# Patient Record
Sex: MALE | Race: WHITE | NOT HISPANIC OR LATINO | Employment: FULL TIME | URBAN - METROPOLITAN AREA
[De-identification: names, ages, dates, MRNs, and addresses within clinical notes are randomized per-mention and may not be internally consistent; named-entity substitution may affect disease eponyms.]

---

## 2017-01-05 ENCOUNTER — APPOINTMENT (OUTPATIENT)
Dept: WOMENS IMAGING | Facility: HOSPITAL | Age: 52
End: 2017-01-05

## 2017-01-05 PROCEDURE — 71010: CPT | Performed by: RADIOLOGY

## 2018-01-29 ENCOUNTER — CONVERSION ENCOUNTER (OUTPATIENT)
Dept: FAMILY MEDICINE CLINIC | Facility: CLINIC | Age: 53
End: 2018-01-29

## 2018-02-22 ENCOUNTER — APPOINTMENT (OUTPATIENT)
Dept: WOMENS IMAGING | Facility: HOSPITAL | Age: 53
End: 2018-02-22

## 2018-03-12 ENCOUNTER — OFFICE VISIT CONVERTED (OUTPATIENT)
Dept: FAMILY MEDICINE CLINIC | Facility: CLINIC | Age: 53
End: 2018-03-12
Attending: FAMILY MEDICINE

## 2018-03-15 ENCOUNTER — CONVERSION ENCOUNTER (OUTPATIENT)
Dept: FAMILY MEDICINE CLINIC | Facility: CLINIC | Age: 53
End: 2018-03-15

## 2018-07-12 ENCOUNTER — CONVERSION ENCOUNTER (OUTPATIENT)
Dept: FAMILY MEDICINE CLINIC | Facility: CLINIC | Age: 53
End: 2018-07-12

## 2018-10-16 ENCOUNTER — CONVERSION ENCOUNTER (OUTPATIENT)
Dept: FAMILY MEDICINE CLINIC | Facility: CLINIC | Age: 53
End: 2018-10-16
Attending: FAMILY MEDICINE

## 2018-12-07 ENCOUNTER — OFFICE VISIT CONVERTED (OUTPATIENT)
Dept: FAMILY MEDICINE CLINIC | Facility: CLINIC | Age: 53
End: 2018-12-07
Attending: FAMILY MEDICINE

## 2019-01-10 ENCOUNTER — OFFICE VISIT CONVERTED (OUTPATIENT)
Dept: OTOLARYNGOLOGY | Facility: CLINIC | Age: 54
End: 2019-01-10
Attending: OTOLARYNGOLOGY

## 2019-02-21 ENCOUNTER — HOSPITAL ENCOUNTER (OUTPATIENT)
Dept: FAMILY MEDICINE CLINIC | Facility: CLINIC | Age: 54
Discharge: HOME | End: 2019-02-21
Attending: NURSE PRACTITIONER

## 2019-02-21 ENCOUNTER — APPOINTMENT (OUTPATIENT)
Dept: WOMENS IMAGING | Facility: HOSPITAL | Age: 54
End: 2019-02-21

## 2019-03-26 ENCOUNTER — OFFICE VISIT CONVERTED (OUTPATIENT)
Dept: FAMILY MEDICINE CLINIC | Facility: CLINIC | Age: 54
End: 2019-03-26
Attending: FAMILY MEDICINE

## 2019-03-26 ENCOUNTER — CONVERSION ENCOUNTER (OUTPATIENT)
Dept: FAMILY MEDICINE CLINIC | Facility: CLINIC | Age: 54
End: 2019-03-26

## 2020-02-11 ENCOUNTER — HOSPITAL ENCOUNTER (OUTPATIENT)
Dept: FAMILY MEDICINE CLINIC | Facility: CLINIC | Age: 55
Discharge: HOME | End: 2020-02-11
Attending: NURSE PRACTITIONER

## 2020-02-11 ENCOUNTER — APPOINTMENT (OUTPATIENT)
Dept: WOMENS IMAGING | Facility: HOSPITAL | Age: 55
End: 2020-02-11

## 2020-06-05 ENCOUNTER — TELEPHONE (OUTPATIENT)
Dept: GASTROENTEROLOGY | Facility: CLINIC | Age: 55
End: 2020-06-05

## 2020-06-05 NOTE — TELEPHONE ENCOUNTER
Received referral from [facility].  Called and LM for patient to schedule.  Scanned referral into media

## 2020-12-04 ENCOUNTER — TELEPHONE (OUTPATIENT)
Dept: GASTROENTEROLOGY | Facility: CLINIC | Age: 55
End: 2020-12-04

## 2020-12-04 NOTE — TELEPHONE ENCOUNTER
Received VM from patient.  Called and LM for patient letting him know he had appt with Dr Robertson and orders could be placed at that time.    Left phone number with ext.  In case he has further questions

## 2020-12-10 ENCOUNTER — OFFICE VISIT (OUTPATIENT)
Dept: GASTROENTEROLOGY | Facility: CLINIC | Age: 55
End: 2020-12-10

## 2020-12-10 VITALS — TEMPERATURE: 97 F | HEIGHT: 74 IN | WEIGHT: 236.4 LBS | BODY MASS INDEX: 30.34 KG/M2

## 2020-12-10 DIAGNOSIS — Z12.12 SCREENING FOR COLORECTAL CANCER: ICD-10-CM

## 2020-12-10 DIAGNOSIS — K21.9 GASTROESOPHAGEAL REFLUX DISEASE, UNSPECIFIED WHETHER ESOPHAGITIS PRESENT: ICD-10-CM

## 2020-12-10 DIAGNOSIS — Z12.11 SCREENING FOR COLORECTAL CANCER: ICD-10-CM

## 2020-12-10 DIAGNOSIS — K22.70 BARRETT'S ESOPHAGUS WITHOUT DYSPLASIA: Primary | ICD-10-CM

## 2020-12-10 PROCEDURE — 99203 OFFICE O/P NEW LOW 30 MIN: CPT | Performed by: INTERNAL MEDICINE

## 2020-12-10 RX ORDER — ATENOLOL 50 MG/1
75 TABLET ORAL DAILY
COMMUNITY

## 2020-12-10 RX ORDER — LOSARTAN POTASSIUM AND HYDROCHLOROTHIAZIDE 12.5; 1 MG/1; MG/1
1 TABLET ORAL DAILY
COMMUNITY
Start: 2020-10-19

## 2020-12-10 NOTE — PROGRESS NOTES
Chief Complaint   Patient presents with   • Heartburn   • Candido's Esophagus        Augusto Jones is a  55 y.o. male here for an initial visit for GERD, Waite's esophagus    HPI this 55-year-old white male patient of Flor Francois MD presents with a history of Waite's esophagus and also possible screening evaluation for colorectal cancer.  He recalls having colonoscopy in his 40s and again at the age of 48.  There is no strong family history of polyps or colorectal cancer and he denies any significant bowel problems.  I encouraged him to consider screening but would also have him check with his insurance carrier to make sure he qualifies.  His Waite's issues needs to be defined, his last upper endoscopy in 2016 was negative for Waite's changes.  He does have reflux issues with intermittent use of esomeprazole.  No complaints of dysphagia.  He does note occasional trace of blood on the tissue associated with internal hemorrhoids.    Past Medical History:   Diagnosis Date   • Waite's esophagus 2010    Dr. Poole at [facility]   • Environmental and seasonal allergies    • GERD (gastroesophageal reflux disease)    • Hypertension    • Ringing of ears, bilateral        Current Outpatient Medications   Medication Sig Dispense Refill   • atenolol (TENORMIN) 50 MG tablet Take 75 mg by mouth Daily.     • losartan-hydrochlorothiazide (HYZAAR) 100-12.5 MG per tablet Take 1 tablet by mouth Daily.       No current facility-administered medications for this visit.        PRN Meds:.    No Known Allergies    Social History     Socioeconomic History   • Marital status: Unknown     Spouse name: Not on file   • Number of children: Not on file   • Years of education: Not on file   • Highest education level: Not on file   Tobacco Use   • Smoking status: Former Smoker     Types: Cigarettes   • Smokeless tobacco: Never Used   Substance and Sexual Activity   • Alcohol use: Yes     Alcohol/week: 12.0 standard  drinks     Types: 12 Cans of beer per week     Binge frequency: Weekly     Comment: Drinks between 10 to 12 beers only on the weekend.   • Drug use: Never   • Sexual activity: Defer       No family history on file.    Review of Systems   Constitutional: Negative for activity change, appetite change, fatigue and unexpected weight change.   HENT: Negative for congestion, facial swelling, sore throat, trouble swallowing and voice change.    Eyes: Negative for photophobia and visual disturbance.   Respiratory: Negative for cough and choking.    Cardiovascular: Negative for chest pain.   Gastrointestinal: Negative for abdominal distention, abdominal pain, anal bleeding, blood in stool, constipation, diarrhea, nausea, rectal pain and vomiting.        GERD   Endocrine: Negative for polyphagia.   Musculoskeletal: Negative for arthralgias, gait problem and joint swelling.   Skin: Negative for color change, pallor and rash.   Allergic/Immunologic: Negative for food allergies.   Neurological: Negative for speech difficulty and headaches.   Hematological: Does not bruise/bleed easily.   Psychiatric/Behavioral: Negative for agitation, confusion and sleep disturbance.       Vitals:    12/10/20 1321   Temp: 97 °F (36.1 °C)       Physical Exam  Vitals signs reviewed.   Constitutional:       General: He is not in acute distress.     Appearance: He is well-developed. He is not diaphoretic.   HENT:      Head: Normocephalic.      Mouth/Throat:      Pharynx: No oropharyngeal exudate.   Eyes:      General: No scleral icterus.     Conjunctiva/sclera: Conjunctivae normal.   Neck:      Musculoskeletal: Normal range of motion.      Thyroid: No thyromegaly.   Cardiovascular:      Rate and Rhythm: Normal rate and regular rhythm.      Heart sounds: No murmur.   Pulmonary:      Effort: No respiratory distress.      Breath sounds: Normal breath sounds. No wheezing or rales.   Abdominal:      General: Bowel sounds are normal. There is no  distension.      Palpations: Abdomen is soft. There is no mass.      Tenderness: There is no abdominal tenderness.   Musculoskeletal: Normal range of motion.         General: No tenderness.   Lymphadenopathy:      Cervical: No cervical adenopathy.   Skin:     General: Skin is warm and dry.      Findings: No erythema or rash.   Neurological:      Mental Status: He is alert and oriented to person, place, and time.   Psychiatric:         Behavior: Behavior normal.         ASSESSMENT   #1 GERD  #2 Waite's esophagus  #3 screening for colorectal cancer        PLAN  Schedule EGD and colonoscopy  Patient to clear colonoscopy with insurance carrier      ICD-10-CM ICD-9-CM   1. Waite's esophagus without dysplasia  K22.70 530.85   2. Screening for colorectal cancer  Z12.11 V76.51    Z12.12 V76.41

## 2021-01-05 ENCOUNTER — TRANSCRIBE ORDERS (OUTPATIENT)
Dept: LAB | Facility: HOSPITAL | Age: 56
End: 2021-01-05

## 2021-01-05 DIAGNOSIS — Z01.818 OTHER SPECIFIED PRE-OPERATIVE EXAMINATION: Primary | ICD-10-CM

## 2021-01-12 ENCOUNTER — LAB (OUTPATIENT)
Dept: LAB | Facility: HOSPITAL | Age: 56
End: 2021-01-12

## 2021-01-12 ENCOUNTER — TELEPHONE (OUTPATIENT)
Dept: GASTROENTEROLOGY | Facility: CLINIC | Age: 56
End: 2021-01-12

## 2021-01-12 DIAGNOSIS — Z01.818 OTHER SPECIFIED PRE-OPERATIVE EXAMINATION: ICD-10-CM

## 2021-01-12 NOTE — TELEPHONE ENCOUNTER
Returned pt call to cancel/reschedule no answer lm on  pt to call back to reschedule placed in depot with galen

## 2021-05-09 VITALS
DIASTOLIC BLOOD PRESSURE: 102 MMHG | HEART RATE: 72 BPM | WEIGHT: 234.12 LBS | SYSTOLIC BLOOD PRESSURE: 144 MMHG | OXYGEN SATURATION: 97 % | BODY MASS INDEX: 30.04 KG/M2 | TEMPERATURE: 97.8 F | HEIGHT: 74 IN

## 2021-05-09 VITALS
HEART RATE: 68 BPM | DIASTOLIC BLOOD PRESSURE: 82 MMHG | WEIGHT: 235 LBS | BODY MASS INDEX: 30.16 KG/M2 | SYSTOLIC BLOOD PRESSURE: 136 MMHG | TEMPERATURE: 97.8 F | OXYGEN SATURATION: 96 % | HEIGHT: 74 IN

## 2021-05-09 VITALS — SYSTOLIC BLOOD PRESSURE: 128 MMHG | DIASTOLIC BLOOD PRESSURE: 84 MMHG

## 2021-05-09 VITALS
HEIGHT: 74 IN | SYSTOLIC BLOOD PRESSURE: 146 MMHG | HEART RATE: 60 BPM | WEIGHT: 238 LBS | BODY MASS INDEX: 30.54 KG/M2 | OXYGEN SATURATION: 97 % | DIASTOLIC BLOOD PRESSURE: 98 MMHG | TEMPERATURE: 97.4 F

## 2021-05-09 VITALS — SYSTOLIC BLOOD PRESSURE: 144 MMHG | DIASTOLIC BLOOD PRESSURE: 88 MMHG

## 2021-05-09 VITALS — SYSTOLIC BLOOD PRESSURE: 134 MMHG | DIASTOLIC BLOOD PRESSURE: 78 MMHG

## 2021-05-10 ENCOUNTER — HOSPITAL ENCOUNTER (OUTPATIENT)
Dept: FAMILY MEDICINE CLINIC | Facility: CLINIC | Age: 56
Discharge: HOME | End: 2021-05-10
Attending: NURSE PRACTITIONER

## 2021-05-10 ENCOUNTER — APPOINTMENT (OUTPATIENT)
Dept: WOMENS IMAGING | Facility: HOSPITAL | Age: 56
End: 2021-05-10

## 2021-05-15 VITALS
WEIGHT: 236.25 LBS | SYSTOLIC BLOOD PRESSURE: 140 MMHG | OXYGEN SATURATION: 97 % | RESPIRATION RATE: 16 BRPM | HEIGHT: 74 IN | DIASTOLIC BLOOD PRESSURE: 88 MMHG | BODY MASS INDEX: 30.32 KG/M2 | TEMPERATURE: 97.8 F | HEART RATE: 66 BPM

## 2022-02-10 ENCOUNTER — PRE-PROCEDURE SCREENING (OUTPATIENT)
Dept: GASTROENTEROLOGY | Facility: CLINIC | Age: 57
End: 2022-02-10

## 2022-04-27 ENCOUNTER — OFFICE VISIT (OUTPATIENT)
Dept: GASTROENTEROLOGY | Facility: CLINIC | Age: 57
End: 2022-04-27

## 2022-04-27 VITALS
WEIGHT: 275 LBS | TEMPERATURE: 97.1 F | HEIGHT: 74 IN | SYSTOLIC BLOOD PRESSURE: 155 MMHG | BODY MASS INDEX: 35.29 KG/M2 | DIASTOLIC BLOOD PRESSURE: 90 MMHG

## 2022-04-27 DIAGNOSIS — Z87.19 HISTORY OF BARRETT'S ESOPHAGUS: Primary | ICD-10-CM

## 2022-04-27 DIAGNOSIS — Z12.12 SCREENING FOR COLORECTAL CANCER: ICD-10-CM

## 2022-04-27 DIAGNOSIS — K21.9 GASTROESOPHAGEAL REFLUX DISEASE, UNSPECIFIED WHETHER ESOPHAGITIS PRESENT: ICD-10-CM

## 2022-04-27 DIAGNOSIS — Z12.11 SCREENING FOR COLORECTAL CANCER: ICD-10-CM

## 2022-04-27 DIAGNOSIS — Z83.71 FH: COLON POLYPS: ICD-10-CM

## 2022-04-27 PROBLEM — Z83.719 FH: COLON POLYPS: Status: ACTIVE | Noted: 2022-04-27

## 2022-04-27 PROCEDURE — 99214 OFFICE O/P EST MOD 30 MIN: CPT | Performed by: NURSE PRACTITIONER

## 2022-04-27 RX ORDER — FLUTICASONE PROPIONATE 50 MCG
SPRAY, SUSPENSION (ML) NASAL
COMMUNITY
Start: 2022-02-08

## 2022-04-27 RX ORDER — PANTOPRAZOLE SODIUM 40 MG/1
40 TABLET, DELAYED RELEASE ORAL DAILY
COMMUNITY
Start: 2022-02-08 | End: 2022-09-21 | Stop reason: SDUPTHER

## 2022-04-27 NOTE — PROGRESS NOTES
Chief Complaint   Patient presents with   • Heartburn   • Waite's Esophagus       Augusto Jones is a  56 y.o. male here for a follow up visit for GERD.    HPI  56-year-old male presents today for follow-up visit for GERD.  He is a patient of Dr. Robertson.  He was last seen in the office by him on 12/10/2020.  He is new to me today.  He was scheduled for an EGD with screening colonoscopy after last visit but unfortunately due to COVID-19 those scopes were canceled.  He has never had a screening colonoscopy before.  His last EGD was in 2016 at [facility].  The patient does have a previous history of Waite's esophagus without dysplasia.  He tells me his reflux is pretty well controlled now on Protonix 40 mg every other day.  He denies any dysphagia, reflux, abdominal pain, nausea and vomiting, diarrhea, constipation, rectal bleeding or melena.  He admits his appetite is good and his weight is stable.  He does have a family history of colon polyps with his father.  He is happy report his bowels move really well every day.  Past Medical History:   Diagnosis Date   • Waite's esophagus 2010    Dr. Poole at [facility]   • Environmental and seasonal allergies    • GERD (gastroesophageal reflux disease)    • Hypertension    • Ringing of ears, bilateral        Past Surgical History:   Procedure Laterality Date   • UPPER GASTROINTESTINAL ENDOSCOPY  2015    [facility]   • UPPER GASTROINTESTINAL ENDOSCOPY  2016    [facility]       Scheduled Meds:    Continuous Infusions:No current facility-administered medications for this visit.      PRN Meds:.    No Known Allergies    Social History     Socioeconomic History   • Marital status:    Tobacco Use   • Smoking status: Former Smoker     Types: Cigarettes   • Smokeless tobacco: Never Used   Substance and Sexual Activity   • Alcohol use: Yes     Alcohol/week: 6.0 standard drinks     Types: 6 Cans of beer per week   • Drug use: Never   • Sexual activity: Defer        History reviewed. No pertinent family history.    Review of Systems   Constitutional: Negative for appetite change, chills, diaphoresis, fatigue, fever and unexpected weight change.   HENT: Negative for nosebleeds, postnasal drip, sore throat, trouble swallowing and voice change.    Respiratory: Negative for cough, choking, chest tightness, shortness of breath and wheezing.    Cardiovascular: Negative for chest pain.   Gastrointestinal: Negative for abdominal distention, abdominal pain, anal bleeding, blood in stool, constipation, diarrhea, nausea, rectal pain and vomiting.   Endocrine: Negative for polydipsia, polyphagia and polyuria.   Musculoskeletal: Negative for gait problem.   Skin: Negative for rash and wound.   Allergic/Immunologic: Negative for food allergies.   Neurological: Negative for dizziness, speech difficulty and light-headedness.   Psychiatric/Behavioral: Negative for confusion, self-injury, sleep disturbance and suicidal ideas.       Vitals:    04/27/22 1517   BP: 155/90   Temp: 97.1 °F (36.2 °C)       Physical Exam  Constitutional:       General: He is not in acute distress.     Appearance: He is well-developed. He is not ill-appearing.   HENT:      Head: Normocephalic.   Eyes:      Pupils: Pupils are equal, round, and reactive to light.   Cardiovascular:      Rate and Rhythm: Normal rate and regular rhythm.      Heart sounds: Normal heart sounds.   Pulmonary:      Effort: Pulmonary effort is normal.      Breath sounds: Normal breath sounds.   Abdominal:      General: Bowel sounds are normal. There is no distension.      Palpations: Abdomen is soft. There is no mass.      Tenderness: There is no abdominal tenderness. There is no guarding or rebound.      Hernia: No hernia is present.   Musculoskeletal:         General: Normal range of motion.   Skin:     General: Skin is warm and dry.   Neurological:      Mental Status: He is alert and oriented to person, place, and time.   Psychiatric:          Speech: Speech normal.         Behavior: Behavior normal.         Judgment: Judgment normal.         No radiology results for the last 7 days     Diagnoses and all orders for this visit:    1. History of Waite's esophagus (Primary)  Overview:  Added automatically from request for surgery [id number]    Orders:  -     Case Request; Standing  -     COVID PRE-OP / PRE-PROCEDURE SCREENING ORDER (NO ISOLATION) - Swab, Nasopharynx; Future  -     Case Request    2. Gastroesophageal reflux disease, unspecified whether esophagitis present  Overview:  Added automatically from request for surgery [id number]    Orders:  -     Case Request; Standing  -     COVID PRE-OP / PRE-PROCEDURE SCREENING ORDER (NO ISOLATION) - Swab, Nasopharynx; Future  -     Case Request    3. Screening for colorectal cancer  Overview:  Added automatically from request for surgery [id number]    Orders:  -     Case Request; Standing  -     COVID PRE-OP / PRE-PROCEDURE SCREENING ORDER (NO ISOLATION) - Swab, Nasopharynx; Future  -     Case Request    4. FH: colon polyps  -     Case Request; Standing  -     COVID PRE-OP / PRE-PROCEDURE SCREENING ORDER (NO ISOLATION) - Swab, Nasopharynx; Future  -     Case Request    Other orders  -     Follow Anesthesia Guidelines / Protocol; Future  -     Obtain Informed Consent; Future     Given his history he is overdue for an EGD and a screening colonoscopy.  I will go ahead and put him on the schedule for both of those today with Dr. Robertson.  Patient is agreeable to the scopes.  GERD seems pretty well controlled on Protonix 40 mg every other day.  Continue GERD precautions.  Bowels seem to be moving well currently.  Patient to call the office with any issues.  Patient to follow-up with me after his scopes.  Patient is agreeable to the plan.

## 2022-05-11 ENCOUNTER — CLINICAL SUPPORT (OUTPATIENT)
Dept: FAMILY MEDICINE CLINIC | Facility: CLINIC | Age: 57
End: 2022-05-11

## 2022-05-11 DIAGNOSIS — Z77.098 EXPOSURE TO TOXIC CHEMICAL: Primary | ICD-10-CM

## 2022-09-16 ENCOUNTER — ANESTHESIA EVENT (OUTPATIENT)
Dept: GASTROENTEROLOGY | Facility: HOSPITAL | Age: 57
End: 2022-09-16

## 2022-09-16 ENCOUNTER — HOSPITAL ENCOUNTER (OUTPATIENT)
Facility: HOSPITAL | Age: 57
Setting detail: HOSPITAL OUTPATIENT SURGERY
Discharge: HOME | End: 2022-09-16
Attending: INTERNAL MEDICINE | Admitting: INTERNAL MEDICINE

## 2022-09-16 ENCOUNTER — ANESTHESIA (OUTPATIENT)
Dept: GASTROENTEROLOGY | Facility: HOSPITAL | Age: 57
End: 2022-09-16

## 2022-09-16 VITALS
RESPIRATION RATE: 16 BRPM | DIASTOLIC BLOOD PRESSURE: 83 MMHG | SYSTOLIC BLOOD PRESSURE: 140 MMHG | TEMPERATURE: 97.8 F | HEIGHT: 74 IN | OXYGEN SATURATION: 96 % | BODY MASS INDEX: 30.29 KG/M2 | HEART RATE: 55 BPM | WEIGHT: 236 LBS

## 2022-09-16 DIAGNOSIS — Z83.71 FH: COLON POLYPS: ICD-10-CM

## 2022-09-16 DIAGNOSIS — K21.9 GASTROESOPHAGEAL REFLUX DISEASE, UNSPECIFIED WHETHER ESOPHAGITIS PRESENT: ICD-10-CM

## 2022-09-16 DIAGNOSIS — Z87.19 HISTORY OF BARRETT'S ESOPHAGUS: ICD-10-CM

## 2022-09-16 DIAGNOSIS — Z12.11 SCREENING FOR COLORECTAL CANCER: ICD-10-CM

## 2022-09-16 DIAGNOSIS — Z12.12 SCREENING FOR COLORECTAL CANCER: ICD-10-CM

## 2022-09-16 PROCEDURE — 87081 CULTURE SCREEN ONLY: CPT | Performed by: INTERNAL MEDICINE

## 2022-09-16 PROCEDURE — 45380 COLONOSCOPY AND BIOPSY: CPT | Performed by: INTERNAL MEDICINE

## 2022-09-16 PROCEDURE — 88313 SPECIAL STAINS GROUP 2: CPT | Performed by: INTERNAL MEDICINE

## 2022-09-16 PROCEDURE — 45385 COLONOSCOPY W/LESION REMOVAL: CPT | Performed by: INTERNAL MEDICINE

## 2022-09-16 PROCEDURE — S0260 H&P FOR SURGERY: HCPCS | Performed by: INTERNAL MEDICINE

## 2022-09-16 PROCEDURE — 88305 TISSUE EXAM BY PATHOLOGIST: CPT | Performed by: INTERNAL MEDICINE

## 2022-09-16 PROCEDURE — 45381 COLONOSCOPY SUBMUCOUS NJX: CPT | Performed by: INTERNAL MEDICINE

## 2022-09-16 PROCEDURE — 43239 EGD BIOPSY SINGLE/MULTIPLE: CPT | Performed by: INTERNAL MEDICINE

## 2022-09-16 PROCEDURE — 25010000002 PROPOFOL 10 MG/ML EMULSION: Performed by: ANESTHESIOLOGY

## 2022-09-16 DEVICE — DEV CLIP ENDO RESOLUTION360 CONTRL ROT 235CM: Type: IMPLANTABLE DEVICE | Site: ASCENDING COLON | Status: FUNCTIONAL

## 2022-09-16 RX ORDER — SODIUM CHLORIDE, SODIUM LACTATE, POTASSIUM CHLORIDE, CALCIUM CHLORIDE 600; 310; 30; 20 MG/100ML; MG/100ML; MG/100ML; MG/100ML
1000 INJECTION, SOLUTION INTRAVENOUS CONTINUOUS
Status: DISCONTINUED | OUTPATIENT
Start: 2022-09-16 | End: 2022-09-16 | Stop reason: HOSPADM

## 2022-09-16 RX ORDER — SODIUM CHLORIDE 0.9 % (FLUSH) 0.9 %
10 SYRINGE (ML) INJECTION AS NEEDED
Status: DISCONTINUED | OUTPATIENT
Start: 2022-09-16 | End: 2022-09-16 | Stop reason: HOSPADM

## 2022-09-16 RX ORDER — PROPOFOL 10 MG/ML
VIAL (ML) INTRAVENOUS AS NEEDED
Status: DISCONTINUED | OUTPATIENT
Start: 2022-09-16 | End: 2022-09-16 | Stop reason: SURG

## 2022-09-16 RX ORDER — LIDOCAINE HYDROCHLORIDE 10 MG/ML
0.5 INJECTION, SOLUTION INFILTRATION; PERINEURAL ONCE AS NEEDED
Status: DISCONTINUED | OUTPATIENT
Start: 2022-09-16 | End: 2022-09-16 | Stop reason: HOSPADM

## 2022-09-16 RX ORDER — LIDOCAINE HYDROCHLORIDE 20 MG/ML
INJECTION, SOLUTION INFILTRATION; PERINEURAL AS NEEDED
Status: DISCONTINUED | OUTPATIENT
Start: 2022-09-16 | End: 2022-09-16 | Stop reason: SURG

## 2022-09-16 RX ADMIN — PROPOFOL 140 MCG/KG/MIN: 10 INJECTION, EMULSION INTRAVENOUS at 09:56

## 2022-09-16 RX ADMIN — PROPOFOL 50 MG: 10 INJECTION, EMULSION INTRAVENOUS at 09:58

## 2022-09-16 RX ADMIN — SODIUM CHLORIDE, POTASSIUM CHLORIDE, SODIUM LACTATE AND CALCIUM CHLORIDE 1000 ML: 600; 310; 30; 20 INJECTION, SOLUTION INTRAVENOUS at 09:24

## 2022-09-16 RX ADMIN — PROPOFOL 30 MG: 10 INJECTION, EMULSION INTRAVENOUS at 10:14

## 2022-09-16 RX ADMIN — PROPOFOL 100 MG: 10 INJECTION, EMULSION INTRAVENOUS at 09:56

## 2022-09-16 RX ADMIN — LIDOCAINE HYDROCHLORIDE 40 MG: 20 INJECTION, SOLUTION INFILTRATION; PERINEURAL at 09:56

## 2022-09-16 NOTE — H&P
"[facility]  Pre Procedure History & Physical    Chief Complaint:   GERD, Waite's esophagus, screening for colorectal cancer    Subjective     HPI:   Is a 56-year-old male presents the endoscopy suite for upper and lower endoscopic evaluations.  He has reflux and history of Waite's esophagus.  Also needs screening for colorectal cancer.  No prior colonoscopy of record.    Past Medical History:   Past Medical History:   Diagnosis Date   • Waite's esophagus 2010    Dr. Poole at [facility]   • Environmental and seasonal allergies    • GERD (gastroesophageal reflux disease)    • Hypertension    • Ringing of ears, bilateral    • Sleep apnea     CPAP       Past Surgical History:  Past Surgical History:   Procedure Laterality Date   • CARDIAC SURGERY     • UPPER GASTROINTESTINAL ENDOSCOPY  2015    [facility]   • UPPER GASTROINTESTINAL ENDOSCOPY  2016    [facility]       Family History:  History reviewed. No pertinent family history.    Social History:   reports that he has quit smoking. His smoking use included cigarettes. He has never used smokeless tobacco. He reports current alcohol use of about 6.0 standard drinks of alcohol per week. He reports that he does not use drugs.    Medications:   No medications prior to admission.       Allergies:  Patient has no known allergies.    ROS:    Pertinent items are noted in HPI, all other systems reviewed and negative     Objective     Height 188 cm (74\").    Physical Exam   Constitutional: Pt is oriented to person, place, and time and well-developed, well-nourished, and in no distress.   Mouth/Throat: Oropharynx is clear and moist.   Neck: Normal range of motion.   Cardiovascular: Normal rate, regular rhythm and normal heart sounds.    Pulmonary/Chest: Effort normal and breath sounds normal.   Abdominal: Soft. Nontender  Skin: Skin is warm and dry.   Psychiatric: Mood, memory, affect and judgment normal.     Assessment & Plan "     Diagnosis:  GERD  Waite's esophagus  Screening for colorectal cancer    Anticipated Surgical Procedure:  EGD, colonoscopy    The risks, benefits, and alternatives of this procedure have been discussed with the patient or the responsible party- the patient understands and agrees to proceed.

## 2022-09-16 NOTE — ANESTHESIA POSTPROCEDURE EVALUATION
"Patient: Augusto Jones    Procedure Summary     Date: 09/16/22 Room / Location:  [facility] /  [facility]    Anesthesia Start: 0951 Anesthesia Stop: 1039    Procedures:       ESOPHAGOGASTRODUODENOSCOPY with cold biopsies (N/A Esophagus)      COLONOSCOPY to cecum/TI with cold biopsy/hot snare with saline lift polypectomies, tattooing, clip x2 (N/A ) Diagnosis:       History of Waite's esophagus      Gastroesophageal reflux disease, unspecified whether esophagitis present      Screening for colorectal cancer      FH: colon polyps      (History of Waite's esophagus [Z87.19])      (Gastroesophageal reflux disease, unspecified whether esophagitis present [K21.9])      (Screening for colorectal cancer [Z12.11, Z12.12])      (FH: colon polyps [Z83.71])    Surgeons: Oskar Robertson MD Provider: Harpreet Buckner MD    Anesthesia Type: MAC ASA Status: 2          Anesthesia Type: MAC    Vitals  No vitals data found for the desired time range.          Post Anesthesia Care and Evaluation    Patient location during evaluation: bedside  Patient participation: complete - patient participated  Level of consciousness: awake and alert  Pain management: adequate    Airway patency: patent  Anesthetic complications: No anesthetic complications  PONV Status: controlled  Cardiovascular status: acceptable  Respiratory status: acceptable  Hydration status: acceptable    Comments: BP (!) 145/106 (BP Location: Left arm, Patient Position: Lying)   Pulse 63   Temp 36.6 °C (97.8 °F) (Oral)   Resp 16   Ht 188 cm (74\")   Wt 107 kg (236 lb)   SpO2 95%   BMI 30.30 kg/m²         "

## 2022-09-16 NOTE — ANESTHESIA PREPROCEDURE EVALUATION
Anesthesia Evaluation     Patient summary reviewed and Nursing notes reviewed   NPO Solid Status: > 8 hours  NPO Liquid Status: > 2 hours           Airway   Mallampati: II  TM distance: >3 FB  Neck ROM: full  No difficulty expected  Dental - normal exam     Pulmonary - normal exam   (+) sleep apnea on CPAP,   Cardiovascular - normal exam  Exercise tolerance: good (4-7 METS)    (+) hypertension,       Neuro/Psych- negative ROS  GI/Hepatic/Renal/Endo    (+)  GERD,      Musculoskeletal (-) negative ROS    Abdominal    Substance History - negative use     OB/GYN          Other                        Anesthesia Plan    ASA 2     MAC     intravenous induction     Anesthetic plan, risks, benefits, and alternatives have been provided, discussed and informed consent has been obtained with: patient.        CODE STATUS:

## 2022-09-18 LAB — UREASE TISS QL: POSITIVE

## 2022-09-20 ENCOUNTER — TELEPHONE (OUTPATIENT)
Dept: GASTROENTEROLOGY | Facility: CLINIC | Age: 57
End: 2022-09-20

## 2022-09-20 NOTE — TELEPHONE ENCOUNTER
Caller: EPHRAIM ORTIZ    Relationship: Emergency Contact    Best call back number: [phone]    What is the best time to reach you: ANY    Who are you requesting to speak with (clinical staff, provider,  specific staff member): CLINICAL STAFF    Do you know the name of the person who called: EPHRAIM    What was the call regarding: RESULTS FROM EGD AND COLONOSCOPY    Do you require a callback:YES

## 2022-09-20 NOTE — TELEPHONE ENCOUNTER
Past showed minimal gastritis and esophagitis but tested urease positive.  Colonoscopy revealed adenomatous polyps.  Would treat with Pylera or Prevpac and continue proton pump inhibitor daily after completion of his treatment.  Would offer follow-up colonoscopy in 3 years time.  Office follow-up annually or call sooner as needed

## 2022-09-21 LAB
LAB AP CASE REPORT: NORMAL
LAB AP DIAGNOSIS COMMENT: NORMAL
PATH REPORT.ADDENDUM SPEC: NORMAL
PATH REPORT.FINAL DX SPEC: NORMAL
PATH REPORT.GROSS SPEC: NORMAL

## 2022-09-21 RX ORDER — AMOXICILLIN 500 MG/1
1000 CAPSULE ORAL 2 TIMES DAILY
Qty: 56 CAPSULE | Refills: 0 | Status: SHIPPED | OUTPATIENT
Start: 2022-09-21

## 2022-09-21 RX ORDER — CLARITHROMYCIN 500 MG/1
500 TABLET, COATED ORAL 2 TIMES DAILY
Qty: 28 TABLET | Refills: 0 | Status: SHIPPED | OUTPATIENT
Start: 2022-09-21

## 2022-09-21 RX ORDER — PANTOPRAZOLE SODIUM 40 MG/1
40 TABLET, DELAYED RELEASE ORAL DAILY
Qty: 30 TABLET | Refills: 11 | Status: SHIPPED | OUTPATIENT
Start: 2022-09-21

## 2022-09-21 RX ORDER — PANTOPRAZOLE SODIUM 40 MG/1
40 TABLET, DELAYED RELEASE ORAL 2 TIMES DAILY
Qty: 28 TABLET | Refills: 0 | Status: SHIPPED | OUTPATIENT
Start: 2022-09-21

## 2022-09-21 NOTE — TELEPHONE ENCOUNTER
Call to spouse, Maude (see hipaa).  Advise per DR Robertson note.  Verb understanding.  Currently taking pantoprazole 40 mg prn.      Denies PCN allergy.     Escribe completed for:    Pantoprazole 40 mg 1 tab po 2x/day, #28, R0  Biaxin 500 mg 1 tab po 2x/day, #28, R0.  Amoxicillin 500 mg 2 tabs po 2x/day, #56, R0    Pantoprazole 40 mg 1 tab po daily, #30, R11 to begin AFTER H pylori tx.     C/s for 9/16/25 placed in recall and HM.  O/v for 9/16/23 placed in recall.     Maude reports that pt noted blood in toilet water with BM on 9/17-9/20.  None today.  Will continue to monitor.  Advise notify office if this resumes.      Update to DR Robertson.

## 2022-09-25 NOTE — TELEPHONE ENCOUNTER
Would communicate with patient to see if bleeding persist.  Given recent colonoscopic examination need to be concerned of possible post polypectomy bleeding.

## 2022-11-08 ENCOUNTER — TELEPHONE (OUTPATIENT)
Dept: GASTROENTEROLOGY | Facility: CLINIC | Age: 57
End: 2022-11-08

## 2022-11-08 NOTE — TELEPHONE ENCOUNTER
----- Message from Augusto Jones sent at 11/7/2022  1:38 PM EST -----  Regarding: Follow-up Testing to Verify Urease Positive Needed?  Contact: [phone]  I have completed all my antibiotics to address my Urease Positive result from my Endo Scope/Gastro examine in September 2022.  Please verify if additional testing is required to verify if level has decreased.    Component Results  Component Your Value Standard Range Flag  Urease Positive   Negative A

## 2022-11-10 NOTE — TELEPHONE ENCOUNTER
November 9, 2022  Oskar Robertson MD  to Me      12:31 PM  No need for retesting of H. pylori since patient did not have ulcers but would continue PPI because of a history of Waite's esophagus and anticipate follow-up EGD in 3 years time.  If he becomes symptomatic we may reconsider further testing sooner.    **VM to pt with request to contact office.  EGD for 9/16/25 placed in recall and HM.  Simran Craft RN.

## 2023-06-16 ENCOUNTER — CLINICAL SUPPORT (OUTPATIENT)
Dept: FAMILY MEDICINE CLINIC | Facility: CLINIC | Age: 58
End: 2023-06-16

## 2023-06-16 DIAGNOSIS — Z77.098 EXPOSURE TO TOXIC CHEMICAL: Primary | ICD-10-CM

## 2024-03-19 ENCOUNTER — TRANSCRIBE ORDERS (OUTPATIENT)
Dept: ADMINISTRATIVE | Facility: HOSPITAL | Age: 59
End: 2024-03-19
Payer: COMMERCIAL

## 2024-03-19 DIAGNOSIS — Z13.6 ENCOUNTER FOR SCREENING FOR VASCULAR DISEASE: Primary | ICD-10-CM

## 2024-03-19 DIAGNOSIS — Z13.6 ENCOUNTER FOR SCREENING FOR CARDIOVASCULAR DISORDERS: Primary | ICD-10-CM

## 2024-05-13 ENCOUNTER — HOSPITAL ENCOUNTER (OUTPATIENT)
Dept: CT IMAGING | Facility: HOSPITAL | Age: 59
Discharge: HOME | End: 2024-05-13

## 2024-05-13 ENCOUNTER — APPOINTMENT (OUTPATIENT)
Dept: CARDIOLOGY | Facility: HOSPITAL | Age: 59
End: 2024-05-13

## 2024-05-13 ENCOUNTER — HOSPITAL ENCOUNTER (OUTPATIENT)
Dept: CARDIOLOGY | Facility: HOSPITAL | Age: 59
Discharge: HOME | End: 2024-05-13

## 2024-05-13 DIAGNOSIS — Z13.6 ENCOUNTER FOR SCREENING FOR CARDIOVASCULAR DISORDERS: ICD-10-CM

## 2024-05-13 DIAGNOSIS — Z13.6 ENCOUNTER FOR SCREENING FOR VASCULAR DISEASE: ICD-10-CM

## 2024-05-13 LAB
BH CV VAS SCREENING CAROTID CCA LEFT: 101 CM/SEC
BH CV VAS SCREENING CAROTID CCA RIGHT: 67 CM/SEC
BH CV VAS SCREENING CAROTID ICA LEFT: 72 CM/SEC
BH CV VAS SCREENING CAROTID ICA RIGHT: 66 CM/SEC
BH CV XLRA MEAS - MID AO DIAM: 1.7 CM
BH CV XLRA MEAS - PAD LEFT ABI PT: 1.3
BH CV XLRA MEAS - PAD LEFT ARM: 162 MMHG
BH CV XLRA MEAS - PAD LEFT LEG PT: 216 MMHG
BH CV XLRA MEAS - PAD RIGHT ABI PT: 1.23
BH CV XLRA MEAS - PAD RIGHT ARM: 166 MMHG
BH CV XLRA MEAS - PAD RIGHT LEG PT: 205 MMHG
BH CV XLRA MEAS LEFT DIST CCA EDV: -24.9 CM/SEC
BH CV XLRA MEAS LEFT DIST CCA PSV: -101 CM/SEC
BH CV XLRA MEAS LEFT ICA/CCA RATIO: 0.7
BH CV XLRA MEAS LEFT PROX ICA EDV: -23.6 CM/SEC
BH CV XLRA MEAS LEFT PROX ICA PSV: -72.1 CM/SEC
BH CV XLRA MEAS RIGHT DIST CCA EDV: 14.3 CM/SEC
BH CV XLRA MEAS RIGHT DIST CCA PSV: 67.1 CM/SEC
BH CV XLRA MEAS RIGHT ICA/CCA RATIO: 1
BH CV XLRA MEAS RIGHT PROX ICA EDV: -16.8 CM/SEC
BH CV XLRA MEAS RIGHT PROX ICA PSV: -65.9 CM/SEC

## 2024-05-13 PROCEDURE — 93799 UNLISTED CV SVC/PROCEDURE: CPT

## 2024-05-13 PROCEDURE — 75571 CT HRT W/O DYE W/CA TEST: CPT

## 2024-05-29 ENCOUNTER — TELEPHONE (OUTPATIENT)
Dept: ORTHOPEDIC SURGERY | Facility: CLINIC | Age: 59
End: 2024-05-29
Payer: COMMERCIAL

## 2024-06-13 ENCOUNTER — OFFICE VISIT (OUTPATIENT)
Dept: CARDIOLOGY | Facility: CLINIC | Age: 59
End: 2024-06-13
Payer: COMMERCIAL

## 2024-06-13 VITALS
BODY MASS INDEX: 31.83 KG/M2 | SYSTOLIC BLOOD PRESSURE: 132 MMHG | HEART RATE: 70 BPM | DIASTOLIC BLOOD PRESSURE: 93 MMHG | WEIGHT: 248 LBS | HEIGHT: 74 IN

## 2024-06-13 DIAGNOSIS — I25.84 CORONARY ARTERY CALCIFICATION: Primary | ICD-10-CM

## 2024-06-13 DIAGNOSIS — I10 PRIMARY HYPERTENSION: ICD-10-CM

## 2024-06-13 DIAGNOSIS — E78.2 MIXED HYPERLIPIDEMIA: ICD-10-CM

## 2024-06-13 DIAGNOSIS — I25.10 CORONARY ARTERY CALCIFICATION: Primary | ICD-10-CM

## 2024-06-13 PROCEDURE — 99204 OFFICE O/P NEW MOD 45 MIN: CPT | Performed by: INTERNAL MEDICINE

## 2024-06-13 RX ORDER — ROSUVASTATIN CALCIUM 10 MG/1
1 TABLET, COATED ORAL DAILY
COMMUNITY
Start: 2024-05-22

## 2024-06-14 NOTE — PROGRESS NOTES
Chief Complaint  Establish Care    Subjective        Augusto Jones presents to [facility] MEDICAL [facility] CARDIOLOGY  History of present illness:    Patient is a 58-year-old male with past medical history significant for hypertension.  He was sent for a coronary calcium score which came back 101.8.  This is just above the mild stage.  He does not smoke.  He is pretty active at work with no exertional chest pain.  In part of the workup he also had a carotid ultrasound, scan of the legs and abdominal aorta which were all negative.  He has a brother who had ventricular tachycardia and AICD but no coronary artery disease.  His mother had a history of carotid stenosis.      Past Medical History:   Diagnosis Date    Waite's esophagus     Dr. Poole at [facility]    Environmental and seasonal allergies     GERD (gastroesophageal reflux disease)     Hypertension     Ringing of ears, bilateral     Sleep apnea     CPAP         Past Surgical History:   Procedure Laterality Date    CARDIAC SURGERY      COLONOSCOPY N/A 2022    Procedure: COLONOSCOPY to cecum/TI with cold biopsy/hot snare with saline lift polypectomies, tattooing, clip x2;  Surgeon: Oskar Robertson MD;  Location: [facility] ENDOSCOPY;  Service: Gastroenterology;  Laterality: N/A;  pre- screening  post- colon polyps, diverticulosis    ENDOSCOPY N/A 2022    Procedure: ESOPHAGOGASTRODUODENOSCOPY with cold biopsies;  Surgeon: Oskar Robertson MD;  Location: [facility] ENDOSCOPY;  Service: Gastroenterology;  Laterality: N/A;  pre- reflux and Waite's esophagus  post- mild gastritis, Waite's changes    UPPER GASTROINTESTINAL ENDOSCOPY      [facility]    UPPER GASTROINTESTINAL ENDOSCOPY      [facility]          Social History     Socioeconomic History    Marital status:    Tobacco Use    Smoking status: Former     Current packs/day: 0.00     Types: Cigarettes     Quit date:      Years since quittin.4    Smokeless  "tobacco: Never   Vaping Use    Vaping status: Never Used   Substance and Sexual Activity    Alcohol use: Yes     Alcohol/week: 6.0 standard drinks of alcohol     Types: 6 Cans of beer per week    Drug use: Never    Sexual activity: Defer         History reviewed. No pertinent family history.       No Known Allergies         Current Outpatient Medications:     atenolol (TENORMIN) 50 MG tablet, Take 1.5 tablets by mouth Daily., Disp: , Rfl:     clarithromycin (BIAXIN) 500 MG tablet, Take 1 tablet by mouth 2 (Two) Times a Day., Disp: 28 tablet, Rfl: 0    fluticasone (FLONASE) 50 MCG/ACT nasal spray, ADMINISTER 2 SPRAYS IN EACH NOSTRIL ONCE DAILY, Disp: , Rfl:     losartan-hydrochlorothiazide (HYZAAR) 100-12.5 MG per tablet, Take 1 tablet by mouth Daily., Disp: , Rfl:     rosuvastatin (CRESTOR) 10 MG tablet, Take 1 tablet by mouth Daily., Disp: , Rfl:     amoxicillin (AMOXIL) 500 MG capsule, Take 2 capsules by mouth 2 (Two) Times a Day. (Patient not taking: Reported on 6/13/2024), Disp: 56 capsule, Rfl: 0    pantoprazole (PROTONIX) 40 MG EC tablet, Take 1 tablet by mouth Daily. To begin AFTER completing H pylori tx, Disp: 30 tablet, Rfl: 11      ROS:  Cardiac review of systems negative.    Objective     /93   Pulse 70   Ht 188 cm (74\")   Wt 112 kg (248 lb)   BMI 31.84 kg/m²       General Appearance:   well developed  well nourished  HENT:   oropharynx moist  lips not cyanotic  Respiratory:  no respiratory distress  normal breath sounds  no rales  Cardiovascular:  no jugular venous distention  regular rhythm  S1 normal, S2 normal  no S3, no S4   no murmur  no rub, no thrill  No carotid bruit  pedal pulses normal  lower extremity edema: none    Musculoskeletal:  no clubbing of fingers.   normocephalic, head atraumatic  Skin:   warm, dry  Psychiatric:  judgement and insight appropriate  normal mood and affect    ECHO:    STRESS:    CATH:  No results found for this or any previous visit.    BMP:     BUN   Date " "Value Ref Range Status   01/26/2023 14 8 - 26 mg/dL Final     Creatinine   Date Value Ref Range Status   01/26/2023 0.84 0.73 - 1.18 mg/dL Final     Sodium   Date Value Ref Range Status   01/26/2023 139 136 - 145 mmol/L Final     Comment:     (note)  Excess protein and/or lipids can falsely decrease sodium levels  (pseudo hyponatremia).     Potassium   Date Value Ref Range Status   01/26/2023 4.2 3.5 - 5.1 mmol/L Final     Chloride   Date Value Ref Range Status   01/26/2023 108 (H) 98 - 107 mmol/L Final     Comment:     (note)  Falsely elevated chloride levels can be seen in patients taking  medications which contain bromide.     Total CO2   Date Value Ref Range Status   01/26/2023 23 22 - 29 mmol/L Final     Calcium   Date Value Ref Range Status   01/26/2023 9.1 8.4 - 10.2 mg/dL Final     BUN/Creatinine Ratio   Date Value Ref Range Status   01/26/2023 16.7 RATIO Final     Anion Gap   Date Value Ref Range Status   01/26/2023 8 5 - 13 (arb'U) Final     Comment:     (note)  Calculation- Na - (Cl + CO2)     LIPIDS:  No results found for: \"CHOL\", \"TRIG\", \"HDL\", \"LDL\", \"VLDL\", \"LDLHDL\"      Procedures             ASSESSMENT:  Diagnoses and all orders for this visit:    1. Coronary artery calcification (Primary)    2. Primary hypertension    3. Mixed hyperlipidemia         PLAN:    1.  Blood pressure has been under good control.  2.  Patient's coronary calcium score was just in the hide mild range.  3.  Discussed with patient the Crestor and baby aspirin.  I told him if he wanted to lower his risk as much as possible this would be the recommendation.  He is in agreement to stay on the Crestor and aspirin.  His baseline cholesterol shows an LDL of 103, HDL 37, and triglycerides 209 when checked 5/22/2024.  4.  Patient does not smoke.  5.  Encouraged the patient to start a regular exercise program 30 minutes 5 days a week and call us if any exertional chest pain.  6.  Patient's modifiable risk factors are under excellent " control.  I do think we can just see him back as needed.      Return if symptoms worsen or fail to improve.     Patient was given instructions and counseling regarding his condition or for health maintenance advice. Please see specific information pulled into the AVS if appropriate.         Shad Casas MD   6/14/2024  09:50 EDT

## 2024-06-17 ENCOUNTER — OFFICE VISIT (OUTPATIENT)
Dept: ORTHOPEDIC SURGERY | Facility: CLINIC | Age: 59
End: 2024-06-17
Payer: COMMERCIAL

## 2024-06-17 VITALS
DIASTOLIC BLOOD PRESSURE: 80 MMHG | OXYGEN SATURATION: 92 % | BODY MASS INDEX: 31.83 KG/M2 | HEIGHT: 74 IN | WEIGHT: 248 LBS | HEART RATE: 75 BPM | SYSTOLIC BLOOD PRESSURE: 163 MMHG

## 2024-06-17 DIAGNOSIS — M25.561 RIGHT KNEE PAIN, UNSPECIFIED CHRONICITY: Primary | ICD-10-CM

## 2024-06-17 PROCEDURE — 99203 OFFICE O/P NEW LOW 30 MIN: CPT | Performed by: ORTHOPAEDIC SURGERY

## 2024-06-17 NOTE — PROGRESS NOTES
"Chief Complaint  Initial Evaluation of the Right Knee     Subjective      Augusto Jones presents to [facility] for initial evaluation of the right knee. He notes his knee pops out of joint laterally.  He notes this has been doing this for 2 months.  He notes no injury or fall.  He has difficulty with stooping, squatting and stairs.  He works on rail cars and farms a lot.  His knee gets locks up and it takes him a minute to get it back into place.     No Known Allergies     Social History     Socioeconomic History    Marital status:    Tobacco Use    Smoking status: Former     Current packs/day: 0.00     Types: Cigarettes     Quit date:      Years since quittin.4    Smokeless tobacco: Never   Vaping Use    Vaping status: Never Used   Substance and Sexual Activity    Alcohol use: Yes     Alcohol/week: 6.0 standard drinks of alcohol     Types: 6 Cans of beer per week    Drug use: Never    Sexual activity: Yes     Partners: Female        I reviewed the patient's chief complaint, history of present illness, review of systems, past medical history, surgical history, family history, social history, medications, and allergy list.     Review of Systems     Constitutional: Denies fevers, chills, weight loss  Cardiovascular: Denies chest pain, shortness of breath  Skin: Denies rashes, acute skin changes  Neurologic: Denies headache, loss of consciousness        Vital Signs:   /80   Pulse 75   Ht 188 cm (74\")   Wt 112 kg (248 lb)   SpO2 92%   BMI 31.84 kg/m²          Physical Exam  General: Alert. No acute distress    Ortho Exam        RIGHT KNEE Flexion 120. Extension 0. Stable to varus/valgus stress. Stable to anterior/posterior drawer. Neurovascularly intact. Pain with  Pamela. Negative Lachman. Positive EHL, FHL, HS and TA. Sensation intact to light touch all 5 nerves of the foot. Ambulates with Antalgic gait. Patella is well tracking. Calf supple, non-tender. " Negative tenderness to the medial joint line. Negative tenderness to the lateral joint line. Negative Crepitus. Good strength to hamstrings, quadriceps, dorsiflexors, and plantar flexors.  Knee Extensor Mechanism intact Mild effusion.  Kellgren-Gerald grading system is a 1.      Procedures        Imaging Results (Most Recent)       Procedure Component Value Units Date/Time    XR Knee 3 View Right [[id number]] Resulted: 06/17/24 1532     Updated: 06/17/24 1533             Result Review :       X-Ray Report:  Right knee X-Ray  Indication: Evaluation of the right knee  AP/Lateral and [place] view(s)  Findings: Mild degenerative changes in the patella femoral line.   Prior studies available for comparison: no             Assessment and Plan     Diagnoses and all orders for this visit:    1. Right knee pain, unspecified chronicity (Primary)  -     XR Knee 3 View Right        Discussed the treatment plan with the patient. I reviewed the X-rays that were obtained today with the patient.     MRI of the right knee.        Call or return if worsening symptoms.    Follow Up     After MRI of the right knee.       Patient was given instructions and counseling regarding his condition or for health maintenance advice. Please see specific information pulled into the AVS if appropriate.     Scribed for Mariusz Medrano MD by Fern Back MA.  06/17/24   15:32 EDT      I have personally performed the services described in this document as scribed by the above individual and it is both accurate and complete. Mariusz Medrano MD 06/17/24

## 2024-06-20 ENCOUNTER — CLINICAL SUPPORT (OUTPATIENT)
Dept: FAMILY MEDICINE CLINIC | Facility: CLINIC | Age: 59
End: 2024-06-20

## 2024-06-20 DIAGNOSIS — Z77.098 EXPOSURE TO POTENTIALLY HAZARDOUS CHEMICAL: Primary | ICD-10-CM

## 2024-07-01 ENCOUNTER — TELEPHONE (OUTPATIENT)
Dept: ORTHOPEDIC SURGERY | Facility: CLINIC | Age: 59
End: 2024-07-01
Payer: COMMERCIAL

## 2024-07-01 NOTE — TELEPHONE ENCOUNTER
Spoke to wife, Maude, informed her we were able to resubmit the request and obtain approval. Also, scheduled patient a follow up appt.

## 2024-07-01 NOTE — TELEPHONE ENCOUNTER
Provider: BEEN    Caller: EPHRAIM ORTIZ    Relationship to Patient: SPOUSE    Phone Number: [phone]    Reason for Call: PT'S SPOUSE CALLED STATING THEY RECEIVED LETTER FROM [employer] STATING MRI WAS DENIED AND AN APPEAL IS NEEDED.

## 2024-07-05 ENCOUNTER — HOSPITAL ENCOUNTER (OUTPATIENT)
Dept: MRI IMAGING | Facility: HOSPITAL | Age: 59
Discharge: HOME | End: 2024-07-05
Admitting: ORTHOPAEDIC SURGERY
Payer: COMMERCIAL

## 2024-07-05 DIAGNOSIS — M25.561 RIGHT KNEE PAIN, UNSPECIFIED CHRONICITY: ICD-10-CM

## 2024-07-05 PROCEDURE — 73721 MRI JNT OF LWR EXTRE W/O DYE: CPT

## 2024-07-09 ENCOUNTER — TELEPHONE (OUTPATIENT)
Dept: ORTHOPEDIC SURGERY | Facility: CLINIC | Age: 59
End: 2024-07-09
Payer: COMMERCIAL

## 2024-07-09 NOTE — TELEPHONE ENCOUNTER
PRITI WITH PATIENT TO CALL OFFICE TO GET AN APPT TO GO OVER THE MRI RESULTS OF HIS KNEE.    OK FOR OFFICE AND HUB TO SCHEDULE FIRST AVAILABLE WITH DR WEST

## 2024-07-09 NOTE — TELEPHONE ENCOUNTER
"  Caller: Augusto Jones \"TATY\"    Relationship: Self    Best call back number: [phone]    What is the best time to reach you: ANYTIME    Who are you requesting to speak with (clinical staff, provider,  specific staff member): OFFICE STAFF    Do you know the name of the person who called: EV    What was the call regarding: MRI F/UP - PT IS ALREADY SCHEDULED FOR 7/24/24. PT ASKED IF ANYTHING SOONER AVAILABLE, BUT THAT IS STILL FIRST AVAILABLE. PT THANKED ME FOR THE ASSISTANCE, DOES NOT NEED A CALL BACK.    Is it okay if the provider responds through MyChart: NO      "

## 2025-04-22 ENCOUNTER — TELEPHONE (OUTPATIENT)
Dept: GASTROENTEROLOGY | Facility: CLINIC | Age: 60
End: 2025-04-22
Payer: COMMERCIAL

## 2025-04-22 NOTE — TELEPHONE ENCOUNTER
"Elissa Maurer, RN routed this conversation to Me (Selected Message)  Elissa Maurer, RN to Augusto Jones \"TATY\"        4/22/25  8:21 AM  You are correct, you will be due both EGD and Colonoscopy in September     Last read by Augusto Jones \"TATY\" at 9:42AM on 4/22/2025.  Augusto Jones \"TATY\" to [facility] (supporting Oskar JOHNSON MD)      4/22/25  8:18 AM  Sounds good.   Is my colonoscopy due as well?  I typically have these two done at same time.  Elissa Maurer, RN to Me  LG      4/22/25  7:59 AM  Pt needs to be screened   Elissa Maurer RN to Augusto Jones \"TATY\"        4/22/25  7:59 AM  Looks like you are due for an EGD September of this year.  I will ask the office screener to mail out the paperwork to get this process started for you.   Have a good day  Elissa REDDING    Last read by Augusto Jones \"TATY\" at 9:42AM on 4/22/2025.  Augusto Jones \"TATY\" to [facility] [place] (supporting Oskar JOHNSON MD)    4/21/25 10:35 PM  Dr Robertson -   My last endocopy and colonoscopy was September 16, 2022.  I am due for scopes this year.   When will your office contact me to schedule?     Thank you   Taty Jones      Mailed patient an OA to complete and mail back.  "

## 2025-05-20 ENCOUNTER — TELEPHONE (OUTPATIENT)
Dept: GASTROENTEROLOGY | Facility: CLINIC | Age: 60
End: 2025-05-20
Payer: COMMERCIAL

## 2025-05-20 DIAGNOSIS — D12.6 ADENOMATOUS POLYP OF COLON, UNSPECIFIED PART OF COLON: ICD-10-CM

## 2025-05-20 DIAGNOSIS — K21.9 GASTROESOPHAGEAL REFLUX DISEASE, UNSPECIFIED WHETHER ESOPHAGITIS PRESENT: Primary | ICD-10-CM

## 2025-05-20 DIAGNOSIS — K22.70 BARRETT'S ESOPHAGUS WITHOUT DYSPLASIA: ICD-10-CM

## 2025-05-20 DIAGNOSIS — Z83.719 FAMILY HISTORY OF POLYPS IN THE COLON: ICD-10-CM

## 2025-05-20 NOTE — TELEPHONE ENCOUNTER
Patient is due for EGD and colonoscopy last one 9/16/22    Personal and family hx polyps  No family hx cx    Asa or blood thinners:  None    List medications:  Atenolol  Losartan  Pantoprazole  Rosuvastatin    OA form scanned in media

## 2025-05-21 ENCOUNTER — TELEPHONE (OUTPATIENT)
Dept: GASTROENTEROLOGY | Facility: CLINIC | Age: 60
End: 2025-05-21
Payer: COMMERCIAL

## 2025-05-27 ENCOUNTER — TELEPHONE (OUTPATIENT)
Dept: GASTROENTEROLOGY | Facility: CLINIC | Age: 60
End: 2025-05-27
Payer: COMMERCIAL

## 2025-05-27 NOTE — TELEPHONE ENCOUNTER
CALLED TO SCHEDULE PT ,VERBALIZES NOT DUE TO NOVEMBER COMMUNICATED I WOULD PLACE IN DEFERRAL UNTIL SEPT  SO THAT HE MAY BE SCHEDULED FOR NOVEMBER VERBALIZED UNDERSTANDING OK FOR HUB TO RELAY

## 2025-06-13 ENCOUNTER — TELEPHONE (OUTPATIENT)
Dept: GASTROENTEROLOGY | Facility: CLINIC | Age: 60
End: 2025-06-13
Payer: COMMERCIAL

## 2025-06-16 ENCOUNTER — TELEPHONE (OUTPATIENT)
Dept: GASTROENTEROLOGY | Facility: CLINIC | Age: 60
End: 2025-06-16
Payer: COMMERCIAL

## 2025-06-17 ENCOUNTER — TELEPHONE (OUTPATIENT)
Dept: GASTROENTEROLOGY | Facility: CLINIC | Age: 60
End: 2025-06-17
Payer: COMMERCIAL

## 2025-06-17 NOTE — TELEPHONE ENCOUNTER
RETURNED PT CALL TO SCHEDULE NO ANSWER LVM OFFERED TWO DATES D/T PT LEFT MESSAGE VERBALIZING NO TIME IS A GOOD TIME SO THAT IF HE MAY HE CAN CHOOSE OF THE DATES OFFERED AND WILL F/U WITH CONFIRMATION OK FOR THE HUB TO RELAY

## 2025-06-17 NOTE — TELEPHONE ENCOUNTER
PT SCHEDULED AT [facility] 07/02/2025 VERBALIZES UNDERSTANDING [facility] WILL CALL A DAY PRIOR TO PROCEDURE WITH TOA CLS/EGD PREP INFORMATION MAILED TO ADDRESS ON FILE VERIFIED BY PT OK FOR THE HUB TO RELAY

## 2025-06-17 NOTE — TELEPHONE ENCOUNTER
"  Caller: Augusto Jones \"TATY\"    Relationship: Self    Best call back number: [phone]    What is the best time to reach you: IN THE NEXT 30 MINUTES HE WILL BE AT LUNCH OR AFTER 4    Who are you requesting to speak with (clinical staff, provider,  specific staff member): SPECIFIC STAFF MEMBER    Do you know the name of the person who called: NIKITA    What was the call regarding: TO SCHEDULE     "

## 2025-06-24 ENCOUNTER — CLINICAL SUPPORT (OUTPATIENT)
Dept: FAMILY MEDICINE CLINIC | Facility: CLINIC | Age: 60
End: 2025-06-24

## 2025-06-24 DIAGNOSIS — Z77.098 EXPOSURE TO POTENTIALLY HAZARDOUS CHEMICAL: Primary | ICD-10-CM

## 2025-06-24 PROCEDURE — 71045 X-RAY EXAM CHEST 1 VIEW: CPT | Performed by: FAMILY MEDICINE

## 2025-07-02 ENCOUNTER — OUTSIDE FACILITY SERVICE (OUTPATIENT)
Dept: GASTROENTEROLOGY | Facility: CLINIC | Age: 60
End: 2025-07-02
Payer: COMMERCIAL

## 2025-07-02 ENCOUNTER — LAB REQUISITION (OUTPATIENT)
Dept: LAB | Facility: HOSPITAL | Age: 60
End: 2025-07-02
Payer: COMMERCIAL

## 2025-07-02 DIAGNOSIS — K29.70 GASTRITIS, UNSPECIFIED, WITHOUT BLEEDING: ICD-10-CM

## 2025-07-02 DIAGNOSIS — K21.00 GASTRO-ESOPHAGEAL REFLUX DISEASE WITH ESOPHAGITIS, WITHOUT BLEEDING: ICD-10-CM

## 2025-07-02 DIAGNOSIS — Z13.810 ENCOUNTER FOR SCREENING FOR UPPER GASTROINTESTINAL DISORDER: ICD-10-CM

## 2025-07-02 PROCEDURE — 88305 TISSUE EXAM BY PATHOLOGIST: CPT | Performed by: INTERNAL MEDICINE

## 2025-07-02 PROCEDURE — 45385 COLONOSCOPY W/LESION REMOVAL: CPT | Performed by: INTERNAL MEDICINE

## 2025-07-02 PROCEDURE — 43239 EGD BIOPSY SINGLE/MULTIPLE: CPT | Performed by: INTERNAL MEDICINE

## 2025-07-03 LAB
CYTO UR: NORMAL
LAB AP CASE REPORT: NORMAL
PATH REPORT.FINAL DX SPEC: NORMAL
PATH REPORT.GROSS SPEC: NORMAL

## (undated) DEVICE — BITEBLOCK OMNI BLOC

## (undated) DEVICE — SNAR POLYP SENSATION STDOVL 27 240 BX40

## (undated) DEVICE — THE SINGLE USE ETRAP – POLYP TRAP IS USED FOR SUCTION RETRIEVAL OF ENDOSCOPICALLY REMOVED POLYPS.: Brand: ETRAP

## (undated) DEVICE — KT ORCA ORCAPOD DISP STRL

## (undated) DEVICE — ADAPT CLN BIOGUARD AIR/H2O DISP

## (undated) DEVICE — FRCP BX RADJAW4 NDL 2.8 240CM LG OG BX40

## (undated) DEVICE — CANN O2 ETCO2 FITS ALL CONN CO2 SMPL A/ 7IN DISP LF

## (undated) DEVICE — SENSR O2 OXIMAX FNGR A/ 18IN NONSTR

## (undated) DEVICE — THE CARR-LOCKE INJECTION NEEDLE IS A SINGLE USE, DISPOSABLE, FLEXIBLE SHEATH INJECTION NEEDLE USED FOR THE INJECTION OF VARIOUS TYPES OF MEDIA THROUGH FLEXIBLE ENDOSCOPES.

## (undated) DEVICE — TUBING, SUCTION, 1/4" X 10', STRAIGHT: Brand: MEDLINE

## (undated) DEVICE — LN SMPL CO2 SHTRM SD STREAM W/M LUER